# Patient Record
Sex: MALE | Race: WHITE | NOT HISPANIC OR LATINO | URBAN - METROPOLITAN AREA
[De-identification: names, ages, dates, MRNs, and addresses within clinical notes are randomized per-mention and may not be internally consistent; named-entity substitution may affect disease eponyms.]

---

## 2023-03-19 ENCOUNTER — HOSPITAL ENCOUNTER (EMERGENCY)
Facility: OTHER | Age: 63
Discharge: HOME | End: 2023-03-19
Attending: EMERGENCY MEDICINE
Payer: MEDICAID

## 2023-03-19 VITALS
RESPIRATION RATE: 35 BRPM | HEART RATE: 50 BPM | TEMPERATURE: 98 F | SYSTOLIC BLOOD PRESSURE: 124 MMHG | DIASTOLIC BLOOD PRESSURE: 66 MMHG | OXYGEN SATURATION: 99 %

## 2023-03-19 DIAGNOSIS — R42 VERTIGO: Primary | ICD-10-CM

## 2023-03-19 DIAGNOSIS — H93.19 TINNITUS, UNSPECIFIED LATERALITY: ICD-10-CM

## 2023-03-19 DIAGNOSIS — R42 DIZZINESS: ICD-10-CM

## 2023-03-19 PROCEDURE — 96365 THER/PROPH/DIAG IV INF INIT: CPT

## 2023-03-19 PROCEDURE — 25000003 PHARM REV CODE 250: Performed by: EMERGENCY MEDICINE

## 2023-03-19 PROCEDURE — 93005 ELECTROCARDIOGRAM TRACING: CPT

## 2023-03-19 PROCEDURE — 93010 ELECTROCARDIOGRAM REPORT: CPT | Mod: ,,, | Performed by: INTERNAL MEDICINE

## 2023-03-19 PROCEDURE — 93010 EKG 12-LEAD: ICD-10-PCS | Mod: ,,, | Performed by: INTERNAL MEDICINE

## 2023-03-19 PROCEDURE — 99284 EMERGENCY DEPT VISIT MOD MDM: CPT | Mod: 25

## 2023-03-19 PROCEDURE — 63600175 PHARM REV CODE 636 W HCPCS: Performed by: EMERGENCY MEDICINE

## 2023-03-19 PROCEDURE — 96361 HYDRATE IV INFUSION ADD-ON: CPT

## 2023-03-19 PROCEDURE — 96375 TX/PRO/DX INJ NEW DRUG ADDON: CPT

## 2023-03-19 RX ORDER — DIPHENHYDRAMINE HYDROCHLORIDE 50 MG/ML
12.5 INJECTION INTRAMUSCULAR; INTRAVENOUS
Status: COMPLETED | OUTPATIENT
Start: 2023-03-19 | End: 2023-03-19

## 2023-03-19 RX ORDER — PROMETHAZINE HYDROCHLORIDE 25 MG/1
25 TABLET ORAL EVERY 6 HOURS PRN
Qty: 15 TABLET | Refills: 0 | Status: SHIPPED | OUTPATIENT
Start: 2023-03-19

## 2023-03-19 RX ORDER — MECLIZINE HYDROCHLORIDE 25 MG/1
25 TABLET ORAL 3 TIMES DAILY PRN
Qty: 20 TABLET | Refills: 0 | Status: SHIPPED | OUTPATIENT
Start: 2023-03-19

## 2023-03-19 RX ADMIN — PROMETHAZINE HYDROCHLORIDE 25 MG: 25 INJECTION INTRAMUSCULAR; INTRAVENOUS at 05:03

## 2023-03-19 RX ADMIN — SODIUM CHLORIDE 1000 ML: 9 INJECTION, SOLUTION INTRAVENOUS at 05:03

## 2023-03-19 RX ADMIN — DIPHENHYDRAMINE HYDROCHLORIDE 12.5 MG: 50 INJECTION, SOLUTION INTRAMUSCULAR; INTRAVENOUS at 05:03

## 2023-03-19 NOTE — ED NOTES
Report taken from MIGUEL A Ceballos. Pt resting comfortably in bed in lowest position. Pt reports dizziness but denies N/V. Comfort needs addressed. Call light and pt belongings within reach.

## 2023-03-19 NOTE — ED PROVIDER NOTES
"     Source of History:  Patient    Chief complaint:  Dizziness (Pt reports "vertigo" onset 1 hour ago. +N/+V. Pt vomiting while on valentin. Pt received 4mg zofran en route. )      HPI:  Ameya Juarez is a 63 y.o. male presenting with onset of room spinning, exacerbated by movement of head, approximately 1 hour ago.  This was followed by nausea and vomiting.  The patient has suffered from tinnitus for past year or more.  And has had about 4 episodes of vertigo during that timeframe.  Not currently on any prescription medication.  Does get occasional migraines, inconsistently associated with tinnitus and vertigo.  Not currently having 1.  No change in vision or hearing.  No weakness or numbness of extremities.    This is the extent to the patients complaints today here in the emergency department.    ROS: As per HPI and below:  General: No fever.  No chills.  Eyes: No visual changes.   ENT: No sore throat. No ear pain.  Urinary: No abnormal urination.  MSK: No back pain. No joint pain.   Integument: No rashes or lesions.      Review of patient's allergies indicates:  No Known Allergies    PMH:  As per HPI and below:  History reviewed. No pertinent past medical history.  Past Surgical History:   Procedure Laterality Date    right ankle  1975    right thumb         Social History     Tobacco Use    Smoking status: Former     Types: Cigarettes     Quit date: 1998     Years since quittin.2   Substance Use Topics    Alcohol use: Yes     Alcohol/week: 21.0 standard drinks     Types: 21 Cans of beer per week    Drug use: Never       Physical Exam:    BP (!) 149/85   Pulse (!) 51   Temp 97.7 °F (36.5 °C) (Oral)   Resp 16   SpO2 99%   Nursing note and vital signs reviewed.  Appearance:  Uncomfortable appearing.  Vomiting immediately prior to my evaluation.  Not diaphoretic.  Eyes: No conjunctival injection.  Pupils are 4 mm.  No nystagmus at present.  ENT: Normal phonation.  TMs and canals are clear.  Moist " mucous membranes.    Cardiac:  Bradycardic rate, regular rhythm, 2+ pulses.  No murmur or gallop.  Musculoskeletal: Good range of motion all joints.  No deformities.  Neck supple.  No meningismus.   Skin: No rashes seen.  Good turgor.  No abrasions.  No ecchymoses.  Neurologic:  5/5 strength x4, normal sensation x4.  Normal speech mentation.  Gait not assessed.  Mental Status:  Alert and oriented x 3.  Appropriate, conversant.    Labs that have been ordered have been independently reviewed and interpreted by myself.    Twelve lead EKG, independently interpreted by myself, shows sinus bradycardia, rate of 45.  No ST or T-wave changes.  No ischemia or arrhythmia.    MDM/ Differential Dx:    63 y.o. male with history of tinnitus and occasional episodes of vertigo.  Vertigo was more severe tonight with nausea and vomiting.  Given Phenergan and Benadryl and patient is now feeling much better although he is somewhat drowsy from these medications.  Will be observed for some time further to allow the sedating effects to wear off but expect patient to be able to be discharged with prescriptions for p.r.n. Phenergan and Antivert.  Encouraged follow-up with his primary care and ENT.  Care is turned over at 6:00 a.m. pending further observation                 Diagnostic Impression:    1. Vertigo    2. Dizziness    3. Tinnitus, unspecified laterality                  Grant Payton II, MD  03/19/23 [id number]

## 2023-03-19 NOTE — ED NOTES
Pt was able to walk down the valentin without assistance. Pt has minor limp which is baseline but otherwise steady isaac.  Pt denies dizziness at this time. MD notified.

## 2023-03-19 NOTE — ED TRIAGE NOTES
"Pt reports "vertigo" onset 1 hour ago. +N/+V. Pt vomiting while on valentin. Pt received 4mg zofran en route.  "